# Patient Record
Sex: FEMALE | Race: WHITE | NOT HISPANIC OR LATINO | URBAN - METROPOLITAN AREA
[De-identification: names, ages, dates, MRNs, and addresses within clinical notes are randomized per-mention and may not be internally consistent; named-entity substitution may affect disease eponyms.]

---

## 2020-06-09 ENCOUNTER — EMERGENCY (EMERGENCY)
Facility: HOSPITAL | Age: 60
LOS: 0 days | Discharge: HOME | End: 2020-06-09
Attending: EMERGENCY MEDICINE
Payer: COMMERCIAL

## 2020-06-09 VITALS — WEIGHT: 199.96 LBS

## 2020-06-09 VITALS
RESPIRATION RATE: 16 BRPM | HEART RATE: 71 BPM | DIASTOLIC BLOOD PRESSURE: 77 MMHG | OXYGEN SATURATION: 100 % | TEMPERATURE: 98 F | SYSTOLIC BLOOD PRESSURE: 130 MMHG

## 2020-06-09 DIAGNOSIS — M54.9 DORSALGIA, UNSPECIFIED: ICD-10-CM

## 2020-06-09 DIAGNOSIS — Z87.39 PERSONAL HISTORY OF OTHER DISEASES OF THE MUSCULOSKELETAL SYSTEM AND CONNECTIVE TISSUE: ICD-10-CM

## 2020-06-09 DIAGNOSIS — M79.621 PAIN IN RIGHT UPPER ARM: ICD-10-CM

## 2020-06-09 DIAGNOSIS — Z87.2 PERSONAL HISTORY OF DISEASES OF THE SKIN AND SUBCUTANEOUS TISSUE: ICD-10-CM

## 2020-06-09 DIAGNOSIS — Z88.1 ALLERGY STATUS TO OTHER ANTIBIOTIC AGENTS STATUS: ICD-10-CM

## 2020-06-09 DIAGNOSIS — Z88.0 ALLERGY STATUS TO PENICILLIN: ICD-10-CM

## 2020-06-09 DIAGNOSIS — R06.02 SHORTNESS OF BREATH: ICD-10-CM

## 2020-06-09 DIAGNOSIS — M54.6 PAIN IN THORACIC SPINE: ICD-10-CM

## 2020-06-09 LAB
ALBUMIN SERPL ELPH-MCNC: 4.1 G/DL — SIGNIFICANT CHANGE UP (ref 3.3–5)
ALP SERPL-CCNC: 83 U/L — SIGNIFICANT CHANGE UP (ref 40–120)
ALT FLD-CCNC: 34 U/L — SIGNIFICANT CHANGE UP (ref 12–78)
ANION GAP SERPL CALC-SCNC: 2 MMOL/L — LOW (ref 5–17)
AST SERPL-CCNC: 12 U/L — LOW (ref 15–37)
BILIRUB SERPL-MCNC: 0.4 MG/DL — SIGNIFICANT CHANGE UP (ref 0.2–1.2)
BUN SERPL-MCNC: 12 MG/DL — SIGNIFICANT CHANGE UP (ref 7–23)
CALCIUM SERPL-MCNC: 9.9 MG/DL — SIGNIFICANT CHANGE UP (ref 8.5–10.1)
CHLORIDE SERPL-SCNC: 106 MMOL/L — SIGNIFICANT CHANGE UP (ref 96–108)
CO2 SERPL-SCNC: 29 MMOL/L — SIGNIFICANT CHANGE UP (ref 22–31)
CREAT SERPL-MCNC: 0.86 MG/DL — SIGNIFICANT CHANGE UP (ref 0.5–1.3)
GLUCOSE SERPL-MCNC: 109 MG/DL — HIGH (ref 70–99)
HCT VFR BLD CALC: 43.5 % — SIGNIFICANT CHANGE UP (ref 34.5–45)
HGB BLD-MCNC: 14.7 G/DL — SIGNIFICANT CHANGE UP (ref 11.5–15.5)
MCHC RBC-ENTMCNC: 31 PG — SIGNIFICANT CHANGE UP (ref 27–34)
MCHC RBC-ENTMCNC: 33.8 GM/DL — SIGNIFICANT CHANGE UP (ref 32–36)
MCV RBC AUTO: 91.8 FL — SIGNIFICANT CHANGE UP (ref 80–100)
NT-PROBNP SERPL-SCNC: 17 PG/ML — SIGNIFICANT CHANGE UP (ref 0–125)
PLATELET # BLD AUTO: 316 K/UL — SIGNIFICANT CHANGE UP (ref 150–400)
POTASSIUM SERPL-MCNC: 4.5 MMOL/L — SIGNIFICANT CHANGE UP (ref 3.5–5.3)
POTASSIUM SERPL-SCNC: 4.5 MMOL/L — SIGNIFICANT CHANGE UP (ref 3.5–5.3)
PROT SERPL-MCNC: 8.4 GM/DL — HIGH (ref 6–8.3)
RBC # BLD: 4.74 M/UL — SIGNIFICANT CHANGE UP (ref 3.8–5.2)
RBC # FLD: 13 % — SIGNIFICANT CHANGE UP (ref 10.3–14.5)
SODIUM SERPL-SCNC: 137 MMOL/L — SIGNIFICANT CHANGE UP (ref 135–145)
TROPONIN I SERPL-MCNC: <0.015 NG/ML — SIGNIFICANT CHANGE UP (ref 0.01–0.04)
WBC # BLD: 5.2 K/UL — SIGNIFICANT CHANGE UP (ref 3.8–10.5)
WBC # FLD AUTO: 5.2 K/UL — SIGNIFICANT CHANGE UP (ref 3.8–10.5)

## 2020-06-09 PROCEDURE — 93005 ELECTROCARDIOGRAM TRACING: CPT

## 2020-06-09 PROCEDURE — 96374 THER/PROPH/DIAG INJ IV PUSH: CPT

## 2020-06-09 PROCEDURE — 80053 COMPREHEN METABOLIC PANEL: CPT

## 2020-06-09 PROCEDURE — 71045 X-RAY EXAM CHEST 1 VIEW: CPT | Mod: 26

## 2020-06-09 PROCEDURE — 93010 ELECTROCARDIOGRAM REPORT: CPT

## 2020-06-09 PROCEDURE — 99284 EMERGENCY DEPT VISIT MOD MDM: CPT | Mod: 25

## 2020-06-09 PROCEDURE — 36415 COLL VENOUS BLD VENIPUNCTURE: CPT

## 2020-06-09 PROCEDURE — 84484 ASSAY OF TROPONIN QUANT: CPT

## 2020-06-09 PROCEDURE — 71045 X-RAY EXAM CHEST 1 VIEW: CPT

## 2020-06-09 PROCEDURE — 83880 ASSAY OF NATRIURETIC PEPTIDE: CPT

## 2020-06-09 PROCEDURE — 85027 COMPLETE CBC AUTOMATED: CPT

## 2020-06-09 PROCEDURE — 99284 EMERGENCY DEPT VISIT MOD MDM: CPT

## 2020-06-09 PROCEDURE — 96375 TX/PRO/DX INJ NEW DRUG ADDON: CPT

## 2020-06-09 RX ORDER — DIAZEPAM 5 MG
5 TABLET ORAL ONCE
Refills: 0 | Status: DISCONTINUED | OUTPATIENT
Start: 2020-06-09 | End: 2020-06-09

## 2020-06-09 RX ORDER — DIAZEPAM 5 MG
1 TABLET ORAL
Qty: 7 | Refills: 0
Start: 2020-06-09 | End: 2020-06-15

## 2020-06-09 RX ORDER — ACETAMINOPHEN 500 MG
1000 TABLET ORAL ONCE
Refills: 0 | Status: COMPLETED | OUTPATIENT
Start: 2020-06-09 | End: 2020-06-09

## 2020-06-09 RX ORDER — KETOROLAC TROMETHAMINE 30 MG/ML
15 SYRINGE (ML) INJECTION ONCE
Refills: 0 | Status: DISCONTINUED | OUTPATIENT
Start: 2020-06-09 | End: 2020-06-09

## 2020-06-09 RX ORDER — MORPHINE SULFATE 50 MG/1
4 CAPSULE, EXTENDED RELEASE ORAL ONCE
Refills: 0 | Status: DISCONTINUED | OUTPATIENT
Start: 2020-06-09 | End: 2020-06-09

## 2020-06-09 RX ADMIN — Medication 1000 MILLIGRAM(S): at 09:11

## 2020-06-09 RX ADMIN — Medication 15 MILLIGRAM(S): at 09:58

## 2020-06-09 RX ADMIN — Medication 5 MILLIGRAM(S): at 09:57

## 2020-06-09 NOTE — ED PROVIDER NOTE - PROGRESS NOTE DETAILS
Pain improved on reassessment.  Pain almost entirely with movement of R shoulder.  No trauma.  No falls.  No e/o dislocation.  Cardiac w/u unremarkable and patient's story not c/w acs.  Will have pt f/u ortho and cards.  Understands indications to return.  D/c home with strict return precautions and prompt outpatient f/u.

## 2020-06-09 NOTE — ED PROVIDER NOTE - CLINICAL SUMMARY MEDICAL DECISION MAKING FREE TEXT BOX
Pt with R sided tenderness to thoracic back.  no midline spinal tenderness.  NO bowel or bladder incontinence or retention.  No LE neuro symptoms.  No prior IVDU.  No fevers.  Low risk ACS and would be atypical.  EKG non-ischemic.  Will check cardiac enzyme x1 given patient symptomatic for 3 days.  Pain is very reproducible and worse with movement of shoulder as well.  NVI to RUE.  No risk factors for dissection and story not c/w this pathology - not ripping/tearing front to back.  Satting well with normal HR - not suspicious for PE - more likely msk - below threshold for further w/u.  No e/o ptx/pna/carditis.  Dispo pending labs and reassessment.

## 2020-06-09 NOTE — ED PROVIDER NOTE - CARE PROVIDER_API CALL
Tate Tellez  ORTHOPAEDIC SURGERY  [address] [place] [address]  Phone: [phone]  Fax: [phone]  Follow Up Time: 4-6 Days    Elías Moore  CARDIOVASCULAR DISEASE  [address] [place] [address]  Phone: [phone]  Fax: [phone]  Follow Up Time: 1-3 Days

## 2020-06-09 NOTE — ED PROVIDER NOTE - PROVIDER TOKENS
PROVIDER:[TOKEN:[[id number]],FOLLOWUP:[4-6 Days]],PROVIDER:[TOKEN:[[id number]],FOLLOWUP:[1-3 Days]]

## 2020-06-09 NOTE — ED ADULT NURSE NOTE - OBJECTIVE STATEMENT
c/o swelling in the joints x 6 months with c/o increased R shoulder pain with numbness radiating down R arm x 3 days. Denies CP, SOB, dizziness. Pt ambulatory with steady gait, even strength b/l. Denies recent trauma to shoulder but admits to R shoulder surgery 12 years ago. Denies PMHX

## 2020-06-09 NOTE — ED PROVIDER NOTE - OBJECTIVE STATEMENT
59 y f no sig pmh presenting with R sided thoracic back pain ongoing for last 3 days.  Radiates down right arm.  Is concerned it is related to spine from prior mohs procedure done near the spine, but this was done for a non-cancerous lesion.  Denies f/c/n/v/d.  Minimal sob.  no significant cp.  No LE edema, but notes that her knees and ankles feel swollen.  Reports paresthesias down right arm as well.  No speech deficits or facial symptoms.  No LE symptoms.  No loss of motor function.  Feels similar to pain from prior rotator cuff surgery per patient report.

## 2020-06-09 NOTE — ED PROVIDER NOTE - PATIENT PORTAL LINK FT
You can access the FollowMyHealth Patient Portal offered by [facility] by registering at the following website: [email or web address]. By joining [employer]’s FollowMyHealth portal, you will also be able to view your health information using other applications (apps) compatible with our system.

## 2020-06-09 NOTE — ED PROVIDER NOTE - PHYSICAL EXAMINATION
Constitutional: NAD, well appearing  HEENT: no rhinorrhea.  No midline c spine tenderness.    CVS:  RRR, no m/r/g  Resp:  CTAB  GI: soft, ntnd  MSK:  Has reproducible ttp to R paraspinal thoracic back.  No midline spinal tenderness.  Well healed biopsy site to left paraspinal region of thoracic back.  no restriction to rom, full ROM to all extremities.  No swelling to knees or ankles on exam, no overlying erythema.  Knees and ankles are non-tender.    Neuro:  A&Ox3, 5/5 strength to all extremities,  SILT to all extremities.  5/5 strength to all extremities.  no pronator drift.    Skin: no rash  psych: clear thought content  Heme/lymph:  No LAD

## 2020-06-09 NOTE — ED ADULT NURSE REASSESSMENT NOTE - NS ED NURSE REASSESS COMMENT FT1
Received report from JORGE marie @ [employer]. Patient just received pain medication. Patient complaining of R shoulder pain, will reassess.

## 2020-06-09 NOTE — ED ADULT TRIAGE NOTE - CHIEF COMPLAINT QUOTE
Pt complains of "severe upper back pain" radiating to right arm and hand with numbness in right hand x 3 days.  Pt denies fever, cough, sick contacts.

## 2022-04-06 ENCOUNTER — TRANSCRIPTION ENCOUNTER (OUTPATIENT)
Age: 62
End: 2022-04-06

## 2022-04-06 PROBLEM — Z00.00 ENCOUNTER FOR PREVENTIVE HEALTH EXAMINATION: Status: ACTIVE | Noted: 2022-04-06

## 2022-08-11 ENCOUNTER — NON-APPOINTMENT (OUTPATIENT)
Age: 62
End: 2022-08-11

## 2025-04-28 ENCOUNTER — EMERGENCY (EMERGENCY)
Facility: HOSPITAL | Age: 65
LOS: 0 days | Discharge: HOME | End: 2025-04-28
Attending: EMERGENCY MEDICINE
Payer: MEDICAID

## 2025-04-28 VITALS
TEMPERATURE: 98 F | WEIGHT: 206.35 LBS | OXYGEN SATURATION: 98 % | DIASTOLIC BLOOD PRESSURE: 87 MMHG | RESPIRATION RATE: 18 BRPM | HEART RATE: 68 BPM | SYSTOLIC BLOOD PRESSURE: 129 MMHG

## 2025-04-28 DIAGNOSIS — Z88.1 ALLERGY STATUS TO OTHER ANTIBIOTIC AGENTS: ICD-10-CM

## 2025-04-28 DIAGNOSIS — R51.9 HEADACHE, UNSPECIFIED: ICD-10-CM

## 2025-04-28 DIAGNOSIS — E78.00 PURE HYPERCHOLESTEROLEMIA, UNSPECIFIED: ICD-10-CM

## 2025-04-28 DIAGNOSIS — Z86.69 PERSONAL HISTORY OF OTHER DISEASES OF THE NERVOUS SYSTEM AND SENSE ORGANS: ICD-10-CM

## 2025-04-28 DIAGNOSIS — Z88.0 ALLERGY STATUS TO PENICILLIN: ICD-10-CM

## 2025-04-28 PROCEDURE — 70498 CT ANGIOGRAPHY NECK: CPT | Mod: MC

## 2025-04-28 PROCEDURE — 99285 EMERGENCY DEPT VISIT HI MDM: CPT

## 2025-04-28 PROCEDURE — 70498 CT ANGIOGRAPHY NECK: CPT | Mod: 26

## 2025-04-28 PROCEDURE — 99284 EMERGENCY DEPT VISIT MOD MDM: CPT | Mod: 25

## 2025-04-28 PROCEDURE — 70496 CT ANGIOGRAPHY HEAD: CPT | Mod: 26

## 2025-04-28 PROCEDURE — 70496 CT ANGIOGRAPHY HEAD: CPT | Mod: MC

## 2025-04-28 PROCEDURE — 96375 TX/PRO/DX INJ NEW DRUG ADDON: CPT | Mod: XU

## 2025-04-28 PROCEDURE — 96365 THER/PROPH/DIAG IV INF INIT: CPT | Mod: XU

## 2025-04-28 RX ORDER — DEXAMETHASONE 0.5 MG/1
10 TABLET ORAL ONCE
Refills: 0 | Status: DISCONTINUED | OUTPATIENT
Start: 2025-04-28 | End: 2025-04-28

## 2025-04-28 RX ORDER — TRIAMCINOLONE ACETONIDE 55 UG/1
1 SPRAY, METERED NASAL
Qty: 1 | Refills: 0
Start: 2025-04-28 | End: 2025-05-11

## 2025-04-28 RX ORDER — KETOROLAC TROMETHAMINE 30 MG/ML
15 INJECTION, SOLUTION INTRAMUSCULAR; INTRAVENOUS ONCE
Refills: 0 | Status: DISCONTINUED | OUTPATIENT
Start: 2025-04-28 | End: 2025-04-28

## 2025-04-28 RX ORDER — DEXAMETHASONE 0.5 MG/1
10 TABLET ORAL ONCE
Refills: 0 | Status: COMPLETED | OUTPATIENT
Start: 2025-04-28 | End: 2025-04-28

## 2025-04-28 RX ORDER — ACETAMINOPHEN 500 MG/5ML
1000 LIQUID (ML) ORAL ONCE
Refills: 0 | Status: COMPLETED | OUTPATIENT
Start: 2025-04-28 | End: 2025-04-28

## 2025-04-28 RX ADMIN — Medication 1000 MILLIGRAM(S): at 14:34

## 2025-04-28 RX ADMIN — Medication 400 MILLIGRAM(S): at 13:52

## 2025-04-28 RX ADMIN — Medication 1000 MILLIGRAM(S): at 14:00

## 2025-04-28 RX ADMIN — KETOROLAC TROMETHAMINE 15 MILLIGRAM(S): 30 INJECTION, SOLUTION INTRAMUSCULAR; INTRAVENOUS at 14:00

## 2025-04-28 RX ADMIN — KETOROLAC TROMETHAMINE 15 MILLIGRAM(S): 30 INJECTION, SOLUTION INTRAMUSCULAR; INTRAVENOUS at 13:48

## 2025-04-28 RX ADMIN — DEXAMETHASONE 102 MILLIGRAM(S): 0.5 TABLET ORAL at 14:30

## 2025-04-28 NOTE — ED STATDOCS - ATTENDING APP SHARED VISIT CONTRIBUTION OF CARE
I personally saw the patient with the JERONIMO, and completed the key components of the history and physical exam. I then discussed the management plan with the JERONIMO.

## 2025-04-28 NOTE — ED STATDOCS - ENMT, MLM
Nasal mucosa clear.  Mouth with normal mucosa  Throat has no vesicles, no oropharyngeal exudates and uvula is midline. Neck supple, no meningismus.

## 2025-04-28 NOTE — ED ADULT TRIAGE NOTE - CHIEF COMPLAINT QUOTE
pt ambulatory to triage c/o headache starting at 1000am. -befast -fall -trauma. pmh migraines. allergic to tetracycline, penicillin

## 2025-04-28 NOTE — ED STATDOCS - CARE PROVIDER_API CALL
Julianne Madrigal  Neurology  [address], [place] [address]  Phone: [phone]  Fax: [phone]  Follow Up Time:

## 2025-04-28 NOTE — ED STATDOCS - OBJECTIVE STATEMENT
Pt is a 64y female w/o pertinent PMHx who presents to the ED after the sudden onset of a "terrible head ache." Patient has a h/o migraines, but states this was "1000x worse," described the pain as a splitting pain on the top of her head down to her neck onset around 9:50 AM. Of note, the patient has high cholesterol that is not currently controlled by medications. Patient has also been under a lot of stress. Has a FMHx of carotid blockage (mother) and heart blockage (brother).

## 2025-04-28 NOTE — ED STATDOCS - PATIENT PORTAL LINK FT
You can access the FollowMyHealth Patient Portal offered by [facility] by registering at the following website: [email or web address]. By joining [employer]’s FollowMyHealth portal, you will also be able to view your health information using other applications (apps) compatible with our system.

## 2025-04-28 NOTE — ED STATDOCS - NEUROLOGICAL, MLM
CN 2-12 intact, no facial droop, upper and lower extremities 5/5 strength, sensation grossly intact, KIKI/FTN normal, no pronator drift, no slurred speech,

## 2025-04-28 NOTE — ED STATDOCS - PROGRESS NOTE DETAILS
no acute findings on workup, patient is feeling improved, reports seasonal allergies and has not yet started antihistamine treatment, advised this may be a contributing factor and will send rx.  follow up and return precautions advised -Miri Veliz PA-C